# Patient Record
(demographics unavailable — no encounter records)

---

## 2025-01-10 NOTE — ASSESSMENT
[FreeTextEntry1] : 75-year-old male presents for frequent episodes of BRBPR.   Plan:  BRBPR: Likely related to hemorrhoid irritation, although bowel movements have been normal. Personal history of polyps. Would recommend repeating colonoscopy for further evaluation, to r/o possible GI bleeding.  Risks versus benefits as well as instructions given. Reviewed medication, no holding parameters.  Suprep given for preparation. Pt agrees to planned procedure. All questions answered.

## 2025-01-10 NOTE — REVIEW OF SYSTEMS
[Negative] : Heme/Lymph [Abdominal Pain] : no abdominal pain [Vomiting] : no vomiting [Constipation] : no constipation [Diarrhea] : no diarrhea [Heartburn] : no heartburn [Melena (black stool)] : no melena [Bleeding] : bleeding [Fecal Incontinence (soiling)] : no fecal incontinence [Bloating (gassiness)] : no bloating

## 2025-01-10 NOTE — HISTORY OF PRESENT ILLNESS
[FreeTextEntry1] : Castillo Sun is a 75-year-old male with PMHx of BPH, HTN, HLD, and colon polyps, presents to the office for episodes of BRBPR over last few months. Pt reports bleeding resolves independently, but episodes have been occurring more frequently over last few months. Denies hard stools or frequent diarrhea. Denies rectal discomfort, or abdominal pain.  Previous colonoscopy in 2020, normal. Denies FMH of CRC. Denies bowel complaints, typically has bowel movements regularly without significant straining or overt bleeding such as melena or hematochezia. Denies upper GI symptoms such as GERD, nausea, vomiting, or dysphagia. Denies unintentional weight loss.

## 2025-01-10 NOTE — PHYSICAL EXAM
[Alert] : alert [Normal Voice/Communication] : normal voice/communication [Healthy Appearing] : healthy appearing [Sclera] : the sclera and conjunctiva were normal [Hearing Threshold Finger Rub Not [place]] : hearing was normal [Normal Lips/Gums] : the lips and gums were normal [Normal Appearance] : the appearance of the neck was normal [No Respiratory Distress] : no respiratory distress [Auscultation Breath Sounds / Voice Sounds] : lungs were clear to auscultation bilaterally [Heart Rate And Rhythm] : heart rate was normal and rhythm regular [Normal S1, S2] : normal S1 and S2 [Bowel Sounds] : normal bowel sounds [Abdomen Tenderness] : non-tender [Abdomen Soft] : soft [Abnormal Walk] : normal gait [Normal Color / Pigmentation] : normal skin color and pigmentation [Oriented To Time, Place, And Person] : oriented to person, place, and time